# Patient Record
Sex: FEMALE | Race: OTHER | Employment: PART TIME | URBAN - METROPOLITAN AREA
[De-identification: names, ages, dates, MRNs, and addresses within clinical notes are randomized per-mention and may not be internally consistent; named-entity substitution may affect disease eponyms.]

---

## 2017-05-23 ENCOUNTER — HOSPITAL ENCOUNTER (OUTPATIENT)
Dept: PHYSICAL THERAPY | Age: 22
Discharge: HOME | End: 2017-05-23
Payer: OTHER GOVERNMENT

## 2017-05-23 PROCEDURE — 97161 PT EVAL LOW COMPLEX 20 MIN: CPT

## 2017-05-23 PROCEDURE — 97110 THERAPEUTIC EXERCISES: CPT

## 2017-05-23 NOTE — PROGRESS NOTES
PHYSICAL THERAPY - DAILY TREATMENT NOTE    Patient Name: Orquidea Shetty        Date: 2017  : 1995   YES Patient  Verified  Visit #:      12  Insurance: Payor:  / Plan: Clifton Pfeiffer DEPENDENTS / Product Type:  /      In time: 7003 Out time: 130   Total Treatment Time: 50     Medicare Time Tracking (below)   Total Timed Codes (min):   1:1 Treatment Time:       TREATMENT AREA =  Left knee pain [M25.562]  SUBJECTIVE  Pain Level (on 0 to 10 scale):  8  / 10   Medication Changes/New allergies or changes in medical history, any new surgeries or procedures?     NO    If yes, update Summary List   Subjective Functional Status/Changes:  []  No changes reported     See POC          OBJECTIVE  Modalities Rationale:     decrease inflammation, decrease pain and increase tissue extensibility to improve patient's ability to perform functional ADLs   min [] Estim, type/location:                                      []  att     []  unatt     []  w/US     []  w/ice    []  w/heat    min []  Mechanical Traction: type/lbs                   []  pro   []  sup   []  int   []  cont    []  before manual    []  after manual    min []  Ultrasound, settings/location:      min []  Iontophoresis w/ dexamethasone, location:                                               []  take home patch       []  in clinic    min []  Ice     []  Heat    location/position:     min []  Vasopneumatic Device, press/temp:     min []  Other:    [] Skin assessment post-treatment (if applicable):    []  intact    []  redness- no adverse reaction     []redness - adverse reaction:        15 min Therapeutic Exercise:  [x]  See flow sheet   Rationale:      increase ROM and increase strength to improve the patients ability to regain full functional mobility of L knee for return to running and daily workouts      min Manual Therapy:    Rationale:      decrease pain, increase ROM, increase tissue extensibility and decrease trigger points to improve the patient's ability to regain full functional mobility     min Therapeutic Activity:    Rationale:    increase strength, improve coordination and increase proprioception to improve the patients ability to      min Neuromuscular Re-ed:    Rationale:    improve coordination, improve balance and increase proprioception to improve the patients ability to      min Gait Training:    Rationale:       min Patient Education:  YES  Reviewed HEP   [x]  Progressed/Changed HEP based on:   Issued written HEP and reviewed     Other Objective/Functional Measures:    See POC  TE per FS     Post Treatment Pain Level (on 0 to 10) scale:   7 / 10     ASSESSMENT  Assessment/Changes in Function:     Progressed treatment as appropriate with good patient tolerance     []  See Progress Note/Recertification   Patient will continue to benefit from skilled PT services to modify and progress therapeutic interventions, address functional mobility deficits, address ROM deficits, address strength deficits, analyze and address soft tissue restrictions, analyze and cue movement patterns, analyze and modify body mechanics/ergonomics and assess and modify postural abnormalities to attain remaining goals.    Progress toward goals / Updated goals:    Progressing towards goals established in POC     PLAN  [x]  Upgrade activities as tolerated YES Continue plan of care   []  Discharge due to :    []  Other:      Therapist: Sylvester Ritter, PT, DPT, MTC, CMTPT    Date: 5/23/2017 Time: 1:29 PM     Future Appointments  Date Time Provider Bernice Ch   6/2/2017 8:30 AM [place] [facility] [place]   6/5/2017 9:00 AM [place] [facility] [place]   6/9/2017 8:30 AM [place] [facility] [place]   6/12/2017 9:00 AM [place] [facility] [place]   6/16/2017 8:30 AM Carolina [place]oma, [place] [facility] [place]   6/19/2017 8:30 AM Carolina [place]oma, [place] [facility] [place]   6/23/2017 8:30 AM [place]oma[place] [facility] [place]   6/26/2017 9:00 AM 1601 Aiden Denny PT [facility] [place]   6/30/2017 8:30 AM Chivo Mccallum, PT [facility] [place]

## 2017-05-23 NOTE — PROGRESS NOTES
[facility] [address] [place] [address] [facility], [address] [place] [address], [address]  Phone: [phone]  Fax: [phone] / [address] [address] PHYSICAL THERAPY SERVICES  Patient Name: Mark Anthony Us : 1995   Medical   Diagnosis: Left knee pain [M25.562] Treatment Diagnosis: L knee pain  L knee MMT   Onset Date: 1.5 mo ago     Referral Source: Christiano Vail MD [facility] of [facility]): 2017   Prior Hospitalization: See medical history Provider #: [id number]   Prior Level of Function: Pain free with running/squatting   Comorbidities: none   Medications: Verified on Patient Summary List   The Plan of Care and following information is based on the information from the initial evaluation.   ===========================================================================================  Assessment / key information: Patient is a 24 y.o. female who presents to [facility] with DX of L knee pain and medial meniscus tear. Patient reports injury occurred during heavy L leg press at gym 1.5 mo ago. She notes initially, significant swelling that has mostly resolved, constant pain that is worse when walking. She notes clicking when walking, she is unable to run/jump and perform weighted squats. Objective PT examination findings include (1) L knee ROM WNL, min flexion ERP (2) good QS, min extensor lag (3) dec L LE strength knee ext 4/5, knee flex 4/5, hip abd 3+/5, hip ext 4-/5 MMT (4) TTP along med jt line and + [place] (5) fair squatting mechanics with dec WBing on L and knee valgus. A home exercise program was demonstrated and provided to address the above objective and functional deficits. Patient can benefit from skilled PT interventions to improve strength, decrease pain, to facilitate performance of ADLs & improve overall functional status. ===========================================================================================  Eval Complexity: History LOW Complexity : Zero comorbidities / personal factors that will impact the outcome / POC;  Examination  MEDIUM Complexity : 3 Standardized tests and measures addressing body structure, function, activity limitation and / or participation in recreation ; Presentation LOW Complexity : Stable, uncomplicated ;  Decision Making MEDIUM Complexity : FOTO score of 26-74; Overall Complexity LOW   Problem List: pain affecting function, decrease ROM, decrease strength, edema affecting function, impaired gait/ balance, decrease ADL/ functional abilitiies, decrease activity tolerance, decrease flexibility/ joint mobility and decrease transfer abilities, FOTO score 39  Treatment Plan may include any combination of the following: Therapeutic exercise, Therapeutic activities, Neuromuscular re-education, Physical agent/modality, Gait/balance training, Manual therapy including dry needling, Aquatic therapy and Patient education  Patient / Family readiness to learn indicated by: asking questions, trying to perform skills and interest  Persons(s) to be included in education: patient (P)  Barriers to Learning/Limitations: no  Measures taken:    Patient Goal (s): \"reduce pain\"   Patient self reported health status: excellent  Rehabilitation Potential: good   Short Term Goals: To be accomplished in  1-2  weeks:  1) Patient to be independent and compliant with initial HEP to prevent further disability. 2) Improve FOTO score to 54 indicating significant functional improvement. 3) Patient to report dec pain to </= 2/10 in order to tolerate squat with correct form. 4) Patient will perform strong QS and demonstrate no lag with SLR in order to maintain stable TKE during gait.  Long Term Goals:  To be accomplished in  3-4  weeks:  1) Patient to be independent & compliant with progressive HEP in preparation for D/C.  2) Improve FOTO score to 62 indicating significant functional improvement in order to return to PLOF. 3) Patient to improve strength to 5/5 MMT in order to return to running and daily workouts. Frequency / Duration:   Patient to be seen  2-3  times per week for 3-4  weeks:  Patient / Caregiver education and instruction: self care, activity modification and exercises  G-Codes (GP): NA  Therapist Signature: David Denson, PT, DPT, MTC, CMTPT Date: 4/37/5308   Certification Period: NA Time: 12:40 PM   ===========================================================================================  I certify that the above Physical Therapy Services are being furnished while the patient is under my care. I agree with the treatment plan and certify that this therapy is necessary. Physician Signature:        Date:       Time:     Please sign and return to [facility] [place] or you may fax the signed copy to [phone]. Thank you.

## 2017-06-12 ENCOUNTER — APPOINTMENT (OUTPATIENT)
Dept: PHYSICAL THERAPY | Age: 22
End: 2017-06-12

## 2017-06-16 ENCOUNTER — APPOINTMENT (OUTPATIENT)
Dept: PHYSICAL THERAPY | Age: 22
End: 2017-06-16

## 2017-06-19 ENCOUNTER — APPOINTMENT (OUTPATIENT)
Dept: PHYSICAL THERAPY | Age: 22
End: 2017-06-19

## 2017-06-23 ENCOUNTER — APPOINTMENT (OUTPATIENT)
Dept: PHYSICAL THERAPY | Age: 22
End: 2017-06-23

## 2017-06-26 ENCOUNTER — APPOINTMENT (OUTPATIENT)
Dept: PHYSICAL THERAPY | Age: 22
End: 2017-06-26

## 2017-06-30 ENCOUNTER — APPOINTMENT (OUTPATIENT)
Dept: PHYSICAL THERAPY | Age: 22
End: 2017-06-30

## 2017-08-02 NOTE — PROGRESS NOTES
[facility] [address] [place] [address] [facility] [address], [address] [address], [address]  Phone: [phone]  Fax: [phone] SUMMARY  Patient Name: William Colmenares : 1995   Treatment/Medical Diagnosis: Left knee pain [M25.562]   Referral Source: Selena Santoyo MD     Date of Initial Visit: 17 Attended Visits: 1 Missed Visits: 3     SUMMARY OF TREATMENT  Initial. Evaluation and HEP instruction. CURRENT STATUS  Patient is a 24 y.o. female who presents to [facility] with DX of L knee pain and medial meniscus tear. An initial HEP was issued and reviewed during her evaluation, but Mrs. Stephens did not attend any of there F/U appointments. RECOMMENDATIONS  Discontinue therapy due to lack of attendance or compliance. If you have any questions/comments please contact us directly at [phone]. Thank you for allowing us to assist in the care of your patient.     Therapist Signature: Miguel Barreto, PT, DPT, MTC, CMTPT Date: 2017     Time: 9:47 AM